# Patient Record
Sex: FEMALE | Race: WHITE | NOT HISPANIC OR LATINO
[De-identification: names, ages, dates, MRNs, and addresses within clinical notes are randomized per-mention and may not be internally consistent; named-entity substitution may affect disease eponyms.]

---

## 2018-06-28 ENCOUNTER — APPOINTMENT (OUTPATIENT)
Dept: BARIATRICS | Facility: CLINIC | Age: 68
End: 2018-06-28

## 2018-06-28 PROBLEM — Z00.00 ENCOUNTER FOR PREVENTIVE HEALTH EXAMINATION: Status: ACTIVE | Noted: 2018-06-28

## 2018-12-10 ENCOUNTER — APPOINTMENT (OUTPATIENT)
Dept: BARIATRICS | Facility: CLINIC | Age: 68
End: 2018-12-10
Payer: COMMERCIAL

## 2018-12-10 PROCEDURE — 97803 MED NUTRITION INDIV SUBSEQ: CPT

## 2020-12-04 ENCOUNTER — APPOINTMENT (OUTPATIENT)
Dept: VASCULAR SURGERY | Facility: CLINIC | Age: 70
End: 2020-12-04
Payer: COMMERCIAL

## 2020-12-04 PROCEDURE — 99072 ADDL SUPL MATRL&STAF TM PHE: CPT

## 2020-12-04 PROCEDURE — 99204 OFFICE O/P NEW MOD 45 MIN: CPT

## 2021-08-24 ENCOUNTER — APPOINTMENT (OUTPATIENT)
Dept: PEDIATRIC ORTHOPEDIC SURGERY | Facility: CLINIC | Age: 71
End: 2021-08-24
Payer: COMMERCIAL

## 2021-08-24 VITALS — BODY MASS INDEX: 36.37 KG/M2 | HEIGHT: 68 IN | WEIGHT: 240 LBS

## 2021-08-24 DIAGNOSIS — Z87.39 PERSONAL HISTORY OF OTHER DISEASES OF THE MUSCULOSKELETAL SYSTEM AND CONNECTIVE TISSUE: ICD-10-CM

## 2021-08-24 DIAGNOSIS — Z80.0 FAMILY HISTORY OF MALIGNANT NEOPLASM OF DIGESTIVE ORGANS: ICD-10-CM

## 2021-08-24 DIAGNOSIS — Z78.9 OTHER SPECIFIED HEALTH STATUS: ICD-10-CM

## 2021-08-24 PROCEDURE — 99213 OFFICE O/P EST LOW 20 MIN: CPT | Mod: 25

## 2021-08-24 PROCEDURE — 20552 NJX 1/MLT TRIGGER POINT 1/2: CPT | Mod: 59

## 2021-08-24 PROCEDURE — 20610 DRAIN/INJ JOINT/BURSA W/O US: CPT

## 2021-08-24 RX ORDER — ASPIRIN 81 MG
81 TABLET, DELAYED RELEASE (ENTERIC COATED) ORAL
Refills: 0 | Status: ACTIVE | COMMUNITY

## 2021-08-24 RX ORDER — ATORVASTATIN CALCIUM 10 MG/1
10 TABLET, FILM COATED ORAL
Refills: 0 | Status: ACTIVE | COMMUNITY

## 2021-08-24 RX ORDER — PANTOPRAZOLE 20 MG/1
20 TABLET, DELAYED RELEASE ORAL
Refills: 0 | Status: ACTIVE | COMMUNITY

## 2021-08-24 NOTE — PHYSICAL EXAM
[de-identified] : Exam today reveals she is obese.  She ambulates with a painful gait on the right side.  There is restricted motion to the lumbar spine of a mild nature.  Spasm and tenderness is noted in the lumbar musculature in the upper lumbar segment..  There is a trigger point here.  She has no significant tension signs present though straight leg raising is uncomfortable on the right side.  Neurologically no focal motor changes are noted she is neurologically stable.  With regards to her right knee there is obvious crepitus on motion and a slight flexion contracture which is chronic of approximately 8 degrees.  There is no instability on stress a moderate effusion is present pain in both medial lateral compartments.  Popliteal fossa calf neurovascular exam are negative.\par \par She has supple motion to the right hip without any significant points of tenderness about the trochanter or anterior hip capsule or buttock.  The left hip moves well with minimal restriction of abduction and internal rotation appropriate for age.  The left knee the site of the total knee replacement reveals excellent motion no effusion no point tenderness and no instability on stress.

## 2021-08-24 NOTE — HISTORY OF PRESENT ILLNESS
[de-identified] : This 70-year-old is seen today for evaluation of back pain.  This patient has a history of multiple chronic problems.  Spinal stenosis herniated disc in the lumbar segment for which she has had conservative treatment over a prolonged period of time.  She had been doing well though recently she noted increasing pain and dysfunction along the left side of the upper lumbar lower thoracic region.  She eventually presented to an urgent care center where she was injected with Toradol and sent home with prescriptions.  She still has discomfort at both sites unchanged.\par \par It is to be noted this patient was treated in the fall of last year for pericarditis following an uncharacterized viral illness which was also complicated by a DVT.  She is not on any anticoagulants other than aspirin.\par \par Her past history is also significant for right total hip replacement left total knee replacement gastric surgery bypass breast reduction hysterectomy and shoulder arthroscopy.\par \par

## 2021-08-24 NOTE — ASSESSMENT
[FreeTextEntry1] : Impression: Myalgia lumbar spine.  Symptomatic arthritis right knee.  Stable status post right total hip replacement and left total knee replacement\par \par Under sterile technique the trigger point in the lumbar musculature was injected with 40 mg of Depo-Medrol and 2 cc of 1% lidocaine.  Similarly the right knee under sterile technique was injected with 40 mg of Depo-Medrol and 3 cc of 1% lidocaine uneventfully.  I have advised flexibility exercises at home for the spine with the potential for physical therapy and pain management if she is not improving.  She has also been prescribed Tylenol 3 and tizanidine medications.  34 minutes was spent evaluating this patient along with discussion of potential courses of action if she has not improving as well as use of medications.

## 2021-08-27 ENCOUNTER — NON-APPOINTMENT (OUTPATIENT)
Age: 71
End: 2021-08-27

## 2021-09-01 ENCOUNTER — NON-APPOINTMENT (OUTPATIENT)
Age: 71
End: 2021-09-01

## 2021-12-17 ENCOUNTER — APPOINTMENT (OUTPATIENT)
Dept: PEDIATRIC ORTHOPEDIC SURGERY | Facility: CLINIC | Age: 71
End: 2021-12-17
Payer: COMMERCIAL

## 2021-12-17 VITALS — HEIGHT: 68 IN | BODY MASS INDEX: 36.37 KG/M2 | WEIGHT: 240 LBS

## 2021-12-17 PROCEDURE — 99212 OFFICE O/P EST SF 10 MIN: CPT | Mod: 25

## 2021-12-17 PROCEDURE — 20610 DRAIN/INJ JOINT/BURSA W/O US: CPT

## 2021-12-17 RX ORDER — HYLAN G-F 20 16MG/2ML
16 SYRINGE (ML) INTRAARTICULAR
Qty: 1 | Refills: 0 | Status: ACTIVE | COMMUNITY
Start: 2021-12-17 | End: 1900-01-01

## 2021-12-17 NOTE — PROCEDURE
[FreeTextEntry1] : Under sterile technique with a Betadine prep using a 22-gauge needle from a medial portal the right knee was injected with 40 mg of Kenalog and 2 cc of 1% lidocaine with a Band-Aid dressing applied at the conclusion this tolerated well

## 2021-12-17 NOTE — HISTORY OF PRESENT ILLNESS
[de-identified] : This patient returns for evaluation of her right knee she has continued to have significant pain and is requesting injection.  We will be seeking again authorization for Hyalgan injections into the right knee as this patient has had improvement with this in the past.

## 2021-12-17 NOTE — ASSESSMENT
[FreeTextEntry1] : Impression: Painful symptomatic advanced arthritis right knee.\par \par She will continue with over-the-counter medications and restricted activities we are seeking authorization for Hyalgan injections into the right knee.  She will return as necessary

## 2021-12-17 NOTE — PHYSICAL EXAM
[de-identified] : Her exam reveals an antalgic gait with limp on the right side she has a moderate effusion to the right knee with painful motion no instability diffuse tenderness noted in both medial lateral compartments

## 2022-01-04 ENCOUNTER — APPOINTMENT (OUTPATIENT)
Dept: BARIATRICS/WEIGHT MGMT | Facility: CLINIC | Age: 72
End: 2022-01-04
Payer: COMMERCIAL

## 2022-01-04 ENCOUNTER — TRANSCRIPTION ENCOUNTER (OUTPATIENT)
Age: 72
End: 2022-01-04

## 2022-01-04 VITALS — WEIGHT: 259 LBS | BODY MASS INDEX: 39.25 KG/M2 | HEIGHT: 68 IN

## 2022-01-04 DIAGNOSIS — Z98.84 BARIATRIC SURGERY STATUS: ICD-10-CM

## 2022-01-04 DIAGNOSIS — M79.18 MYALGIA, OTHER SITE: ICD-10-CM

## 2022-01-04 DIAGNOSIS — Z96.641 PRESENCE OF RIGHT ARTIFICIAL HIP JOINT: ICD-10-CM

## 2022-01-04 DIAGNOSIS — Z96.652 PRESENCE OF LEFT ARTIFICIAL KNEE JOINT: ICD-10-CM

## 2022-01-04 DIAGNOSIS — E66.9 OBESITY, UNSPECIFIED: ICD-10-CM

## 2022-01-04 PROCEDURE — 99204 OFFICE O/P NEW MOD 45 MIN: CPT | Mod: 95

## 2022-01-04 RX ORDER — BUPROPION HYDROCHLORIDE 100 MG/1
TABLET, FILM COATED ORAL
Refills: 0 | Status: ACTIVE | COMMUNITY

## 2022-01-04 NOTE — ASSESSMENT
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 Recommend a diet high in vegetables intake & lean protein. Recommend 60-80 grams of protein daily and 64 ounces of water intake daily.\par Recommend reducing dietary intake of carbohydrates and eating complex carbs instead of simple carbs. Discussed healthy carb options.\par Recommend Tracking dietary intake daily to increase your awareness of dietary intake. \par Physical Activity- limited by arthritis. Recommend trying chair yoga or gentle chair exercises- can look up options on you tube. May benefit from PT if ortho feels this is appropriate. \par Labs- reviewed recent lab results from 7/2021. \par Medication- pt meets criteria to initiate medication treatment of obesity, but limited in medication options due to her age. Would recommend increasing dose of Wellbutrin to 200 or 300 mg daily to help with emotional eating component. \par RTO in 1 month\par \par

## 2022-01-04 NOTE — CONSULT LETTER
[Dear  ___] : Dear  [unfilled], [( Thank you for referring [unfilled] for consultation for _____ )] : Thank you for referring [unfilled] for consultation for [unfilled] [Please see my note below.] : Please see my note below. [Consult Closing:] : Thank you very much for allowing me to participate in the care of this patient.  If you have any questions, please do not hesitate to contact me. [Sincerely,] : Sincerely, [FreeTextEntry3] : Brandy Shaw, NP

## 2022-01-04 NOTE — HISTORY OF PRESENT ILLNESS
[Other Location: e.g. School (Enter Location, City,State)___] : at [unfilled], at the time of the visit. [Other Location: e.g. Home (Enter Location, City,State)___] : at [unfilled] [Verbal consent obtained from patient] : the patient, [unfilled] [FreeTextEntry1] : 70 y/o Female referred for medical weight loss by Dr. Gonzales \par Hx of sleeve gastrectomy in 2018, states that she lost 90 lbs and has regained 30 lbs over the last 2 years during the COVID 19 pandemic\par + adequate protein intake and water intake but started incorporated more carbs over time and became less active due to knee, back and foot pain. Walks with a limp at present, denies using an assistive device.\par Highest Adult Wt pre-op- 292 lbs\par Food Recall:  B-hard boiled eggs, snack-nuts, L-chicken cutlet, small rice, eggplant, D- 2 slices of steak, mashed potato & salad, snack- fruit, premier protein shake.\par Water Intake- adequate. \par Exercise- cannot tolerate walking long distances. Open to trying chair exercises, but hasn't tried \par Stress Level- low at present, lives with her daughters family\par Sleep- adequate\par Social Hx- employed, lives with daughter, denies alcohol use, denies illicit drug use, denies smoking \par \par

## 2022-01-28 RX ORDER — TIZANIDINE 4 MG/1
4 TABLET ORAL 3 TIMES DAILY
Qty: 30 | Refills: 0 | Status: ACTIVE | COMMUNITY
Start: 2022-01-28 | End: 1900-01-01

## 2022-01-28 RX ORDER — HYDROCODONE BITARTRATE AND ACETAMINOPHEN 5; 325 MG/1; MG/1
5-325 TABLET ORAL
Qty: 15 | Refills: 0 | Status: ACTIVE | COMMUNITY
Start: 2022-01-28 | End: 1900-01-01

## 2022-01-31 RX ORDER — HYDROCODONE BITARTRATE AND ACETAMINOPHEN 5; 325 MG/1; MG/1
5-325 TABLET ORAL
Qty: 15 | Refills: 0 | Status: ACTIVE | COMMUNITY
Start: 2022-01-31 | End: 1900-01-01

## 2022-02-10 ENCOUNTER — APPOINTMENT (OUTPATIENT)
Dept: PEDIATRIC ORTHOPEDIC SURGERY | Facility: CLINIC | Age: 72
End: 2022-02-10
Payer: COMMERCIAL

## 2022-02-10 VITALS — WEIGHT: 259 LBS | HEIGHT: 68 IN | BODY MASS INDEX: 39.25 KG/M2

## 2022-02-10 PROCEDURE — 20610 DRAIN/INJ JOINT/BURSA W/O US: CPT

## 2022-02-10 NOTE — PROCEDURE
[Injection] : Injection [Right] : of the right [Knee Joint] : knee joint [Osteoarthritis] : Osteoarthritis [Inflammation] : Inflammation [Joint Pain] : Joint pain [Patient] : patient [Risk] : risk [Benefits] : benefits [Alternatives] : alternatives [Bleeding] : bleeding [Infection] : infection [Allergic Reaction] : allergic reaction [Verbal Consent Obtained] : verbal consent was obtained prior to the procedure [Betadine] : Betadine [Ethyl Chloride Spray] : ethyl chloride spray was used as a topical anesthetic [1%] : 1% lidocaine [Medial] : medial [22] : a 22-gauge [2 mL Euflexxa___(lot #)] : 2mL Euflexxa ~Ulot# [unfilled] [Bandage Applied] : a bandage [Tolerated Well] : The patient tolerated the procedure well [None] : none [No Strenuous Activity___day(s)] : avoid strenuous activity for [unfilled] day(s) [___ Week(s)] : in [unfilled] week(s)

## 2022-02-10 NOTE — HISTORY OF PRESENT ILLNESS
[de-identified] : This 71-year-old returns because of persistent right knee pain stiffness mild swelling and limp.  She is here for her first Euflexxa injection

## 2022-02-10 NOTE — PHYSICAL EXAM
[de-identified] : Her exam reveals no significant interval changes she is obese.  Antalgic gait right greater than left.  Good motion to the right hip the right knee 5 degree lack to full extension and 8 degrees full flexion.  No instability she has moderate effusion crepitus on motion and pain in all 3 compartments popliteal fossa calf neurovascular exam are stable

## 2022-02-10 NOTE — ASSESSMENT
[FreeTextEntry1] : Impression: Symptomatic arthritis right knee.\par \par She received her first Euflexxa injection we have discussed care after which she will return in 1 week for her second

## 2022-02-17 ENCOUNTER — APPOINTMENT (OUTPATIENT)
Dept: PEDIATRIC ORTHOPEDIC SURGERY | Facility: CLINIC | Age: 72
End: 2022-02-17
Payer: COMMERCIAL

## 2022-02-17 VITALS — WEIGHT: 259 LBS | HEIGHT: 68 IN | BODY MASS INDEX: 39.25 KG/M2

## 2022-02-17 PROCEDURE — 20610 DRAIN/INJ JOINT/BURSA W/O US: CPT

## 2022-02-17 NOTE — PROCEDURE
[FreeTextEntry1] : Under sterile technique with a Betadine prep using a 22-gauge needle the right knee was injected from the medial portal with 2 mL of Euflexxa with a Band-Aid dressing applied at the conclusion this was tolerated well.  The lot number for this medication is [id number]

## 2022-02-17 NOTE — PHYSICAL EXAM
[de-identified] : On exam there is a less effusion to the knee with good motion less discomfort in the medial compartment.

## 2022-02-17 NOTE — HISTORY OF PRESENT ILLNESS
[de-identified] : This 71-year-old returns for her second Euflexxa injection to the right knee she tolerated the first very well

## 2022-02-17 NOTE — ASSESSMENT
[FreeTextEntry1] : Impression: Symptomatic arthritis right knee.\par \par Under sterile technique this patient received a second Euflexxa injection uneventfully.  She will return in 1 week for the third

## 2022-02-24 ENCOUNTER — APPOINTMENT (OUTPATIENT)
Dept: PEDIATRIC ORTHOPEDIC SURGERY | Facility: CLINIC | Age: 72
End: 2022-02-24
Payer: COMMERCIAL

## 2022-02-24 VITALS — BODY MASS INDEX: 39.25 KG/M2 | HEIGHT: 68 IN | WEIGHT: 259 LBS

## 2022-02-24 VITALS — HEIGHT: 68 IN | BODY MASS INDEX: 39.25 KG/M2 | WEIGHT: 259 LBS

## 2022-02-24 PROCEDURE — 20610 DRAIN/INJ JOINT/BURSA W/O US: CPT

## 2022-02-24 NOTE — HISTORY OF PRESENT ILLNESS
[de-identified] : This 71-year-old returns for her final Euflexxa injection to the right knee.  She is noting slow but progressive improvement following the first 2 shots

## 2022-02-24 NOTE — ASSESSMENT
[FreeTextEntry1] : Impression: Symptomatic arthritis right knee.\par \par She received her final Euflexxa injection uneventfully she will return as necessary

## 2022-02-24 NOTE — PHYSICAL EXAM
[de-identified] : Her exam reveals a small effusion to the knee with otherwise unchanged exam less tenderness to palpation

## 2022-02-24 NOTE — PROCEDURE
[FreeTextEntry1] : Under sterile technique with a Betadine prep the right knee was injected with a 22-gauge needle from a medial portal with 2 mL of Euflexxa with a Band-Aid dressing applied at the occlusion at the conclusion this was tolerated well.  The lot number is [id number]

## 2022-04-25 ENCOUNTER — APPOINTMENT (OUTPATIENT)
Dept: PEDIATRIC ORTHOPEDIC SURGERY | Facility: CLINIC | Age: 72
End: 2022-04-25
Payer: COMMERCIAL

## 2022-04-25 VITALS — BODY MASS INDEX: 39.25 KG/M2 | HEIGHT: 68 IN | WEIGHT: 259 LBS

## 2022-04-25 PROCEDURE — 99212 OFFICE O/P EST SF 10 MIN: CPT | Mod: 25

## 2022-04-25 PROCEDURE — 20610 DRAIN/INJ JOINT/BURSA W/O US: CPT

## 2022-04-25 NOTE — ASSESSMENT
[FreeTextEntry1] : Impression: Symptomatic advanced arthritis right knee.\par \par We have again discussed the likelihood of joint replacement surgery over time.  Return as necessary

## 2022-04-25 NOTE — PHYSICAL EXAM
[de-identified] : Her exam reveals an antalgic gait with limp painful motion no erythema or increased warmth to suggest infection.  Tender in both medial and lateral compartments neurovascular status is intact no evidence of DVT

## 2022-04-25 NOTE — HISTORY OF PRESENT ILLNESS
[de-identified] : This patient returns the series of Euflexxa injection she received did not provide her with any benefit whatsoever.  She has had a flare of pain and is requesting a steroid injection

## 2022-06-15 RX ORDER — TIZANIDINE 4 MG/1
4 TABLET ORAL 3 TIMES DAILY
Qty: 30 | Refills: 1 | Status: ACTIVE | COMMUNITY
Start: 2022-06-15 | End: 1900-01-01

## 2022-06-15 RX ORDER — HYDROCODONE BITARTRATE AND ACETAMINOPHEN 5; 325 MG/1; MG/1
5-325 TABLET ORAL
Qty: 20 | Refills: 0 | Status: ACTIVE | COMMUNITY
Start: 2022-06-15 | End: 1900-01-01

## 2022-06-17 RX ORDER — TIZANIDINE 4 MG/1
4 TABLET ORAL 3 TIMES DAILY
Qty: 30 | Refills: 1 | Status: ACTIVE | COMMUNITY
Start: 2022-06-17 | End: 1900-01-01

## 2022-06-17 RX ORDER — HYDROCODONE BITARTRATE AND ACETAMINOPHEN 5; 325 MG/1; MG/1
5-325 TABLET ORAL
Qty: 15 | Refills: 0 | Status: ACTIVE | COMMUNITY
Start: 2022-06-17 | End: 1900-01-01

## 2022-07-15 ENCOUNTER — APPOINTMENT (OUTPATIENT)
Dept: PEDIATRIC ORTHOPEDIC SURGERY | Facility: CLINIC | Age: 72
End: 2022-07-15

## 2022-07-15 VITALS — HEIGHT: 68 IN | WEIGHT: 260 LBS | BODY MASS INDEX: 39.4 KG/M2

## 2022-07-15 DIAGNOSIS — M17.11 UNILATERAL PRIMARY OSTEOARTHRITIS, RIGHT KNEE: ICD-10-CM

## 2022-07-15 PROCEDURE — 99213 OFFICE O/P EST LOW 20 MIN: CPT

## 2022-07-15 NOTE — PHYSICAL EXAM
[de-identified] : Her exam reveals an obvious antalgic gait with limp on the right side moderate effusion to the knee no increased warmth or erythema.  Her motion is good though it is with crepitus and diffuse pain.  She has had documented x-rays in the past revealing advanced arthritic change

## 2022-07-15 NOTE — HISTORY OF PRESENT ILLNESS
[de-identified] : Patient returns she has continued to have significant pain stiffness and limp with increasing functional disability to the right knee.  She is becoming depressed over her ability to function and has been using a walker as well

## 2023-03-21 ENCOUNTER — APPOINTMENT (OUTPATIENT)
Dept: PEDIATRIC ORTHOPEDIC SURGERY | Facility: CLINIC | Age: 73
End: 2023-03-21
Payer: COMMERCIAL

## 2023-03-21 VITALS
WEIGHT: 260 LBS | SYSTOLIC BLOOD PRESSURE: 164 MMHG | BODY MASS INDEX: 39.4 KG/M2 | HEIGHT: 68 IN | DIASTOLIC BLOOD PRESSURE: 75 MMHG | TEMPERATURE: 97.6 F

## 2023-03-21 DIAGNOSIS — M75.41 IMPINGEMENT SYNDROME OF RIGHT SHOULDER: ICD-10-CM

## 2023-03-21 DIAGNOSIS — M54.12 RADICULOPATHY, CERVICAL REGION: ICD-10-CM

## 2023-03-21 DIAGNOSIS — M75.42 IMPINGEMENT SYNDROME OF LEFT SHOULDER: ICD-10-CM

## 2023-03-21 PROCEDURE — 73030 X-RAY EXAM OF SHOULDER: CPT | Mod: 50

## 2023-03-21 PROCEDURE — 72040 X-RAY EXAM NECK SPINE 2-3 VW: CPT

## 2023-03-21 PROCEDURE — 99213 OFFICE O/P EST LOW 20 MIN: CPT

## 2023-03-21 NOTE — PHYSICAL EXAM
[de-identified] : Examination today reveals mild restricted motion to the cervical spine in flexion extension.  Rotation and tilt to the left is painful.  There is spasm and tenderness on both sides of the midline in the subaxial region though no trigger points present.  Examination of both shoulders reveals she has an obvious arc of impingement on both sides more so on the left.  Mild restricted abduction and forward flexion in the scapular plane again left greater than right.  There is no crepitus or clicking on motion.  No instability on stress.  Tenderness in the subacromial space is noted again left greater than right.  She is neurologically intact.\par \par X-rays ordered and taken today of the cervical spine and both shoulders reveal significant disc space narrowing at C4-5, C5-6 and see 6-7 with spur formation and spondylosis.  Both shoulders reveal mild degenerative changes with spur formation

## 2023-03-21 NOTE — ASSESSMENT
[FreeTextEntry1] : Pression: Cervical radiculitis impingement syndrome both shoulders.\par \par She will be treated with a Medrol Dosepak along with tizanidine and will return on a as needed basis.

## 2023-03-21 NOTE — HISTORY OF PRESENT ILLNESS
[de-identified] : This 72-year-old returns she has had 2 weeks of pain to both shoulders as well as in the base of the neck with no obvious injury.  She has had acupuncture with no significant improvement.  She has significant muscle spasms her pain does travel down the left upper extremity into the hand.  She does have pain with placement of both upper extremities and space above the horizontal plane.  It is to be noted she had a right total knee replacement this past November and is doing well with this

## 2023-03-22 RX ORDER — TIZANIDINE 4 MG/1
4 TABLET ORAL TWICE DAILY
Qty: 30 | Refills: 1 | Status: ACTIVE | COMMUNITY
Start: 2023-03-22 | End: 1900-01-01

## 2023-03-22 RX ORDER — METHYLPREDNISOLONE 4 MG/1
4 TABLET ORAL
Qty: 1 | Refills: 1 | Status: ACTIVE | COMMUNITY
Start: 2023-03-22 | End: 1900-01-01

## 2023-06-09 ENCOUNTER — APPOINTMENT (OUTPATIENT)
Dept: PEDIATRIC ORTHOPEDIC SURGERY | Facility: CLINIC | Age: 73
End: 2023-06-09
Payer: COMMERCIAL

## 2023-06-09 VITALS
WEIGHT: 260 LBS | SYSTOLIC BLOOD PRESSURE: 145 MMHG | HEIGHT: 68 IN | BODY MASS INDEX: 39.4 KG/M2 | DIASTOLIC BLOOD PRESSURE: 85 MMHG | TEMPERATURE: 97.1 F

## 2023-06-09 PROCEDURE — 73502 X-RAY EXAM HIP UNI 2-3 VIEWS: CPT

## 2023-06-09 PROCEDURE — 99213 OFFICE O/P EST LOW 20 MIN: CPT

## 2023-06-09 NOTE — HISTORY OF PRESENT ILLNESS
[de-identified] : This 72-year-old is seen today for evaluation of her left hip.  This patient is status post right total knee replacement and is doing well with this however over time because of the significant length of time she was using a walker ambulating with a flexed attitude forward secondary to hip flexor contractures as well as chronic back pain.  She has been struggling along in PT.  Her right hip is asymptomatic.

## 2023-06-09 NOTE — ASSESSMENT
[FreeTextEntry1] : Impression: Symptomatic arthritis left hip.\par \par She is unable to tolerate nonsteroidals.  She will continue with her therapy weight reduction and I have prescribed intra-articular steroid injection into the left hip by radiology.  Ultimately if she is not improving and is miserable and no longer willing to tolerate this condition she will need left total hip arthroplasty.

## 2023-06-09 NOTE — PHYSICAL EXAM
[de-identified] : Examination today reveals she has an obvious limp more so on the left side.  She has restricted motion to this left hip in all planes with pain on motion.  The left knee is unremarkable.  She has good motion to the right hip as well as to the right knee.  Straight leg raising is negative.  She is neurologically stable.\par \par X-rays ordered and taken today of the left hip to include the pelvis reveal moderate to advancing degenerative arthritis of the left hip.  The right hip is in stable alignment without interval change

## 2023-10-16 RX ORDER — HYDROCODONE BITARTRATE AND ACETAMINOPHEN 5; 325 MG/1; MG/1
5-325 TABLET ORAL
Qty: 15 | Refills: 0 | Status: ACTIVE | COMMUNITY
Start: 2023-10-16 | End: 1900-01-01

## 2023-10-16 RX ORDER — TIZANIDINE 4 MG/1
4 TABLET ORAL 3 TIMES DAILY
Qty: 30 | Refills: 1 | Status: ACTIVE | COMMUNITY
Start: 2023-10-16 | End: 1900-01-01

## 2023-10-20 ENCOUNTER — APPOINTMENT (OUTPATIENT)
Dept: PEDIATRIC ORTHOPEDIC SURGERY | Facility: CLINIC | Age: 73
End: 2023-10-20
Payer: COMMERCIAL

## 2023-10-20 VITALS — HEIGHT: 68 IN | WEIGHT: 260 LBS | BODY MASS INDEX: 39.4 KG/M2

## 2023-10-20 PROCEDURE — 20552 NJX 1/MLT TRIGGER POINT 1/2: CPT | Mod: 59

## 2023-10-20 PROCEDURE — 99213 OFFICE O/P EST LOW 20 MIN: CPT | Mod: 25

## 2023-12-26 ENCOUNTER — APPOINTMENT (OUTPATIENT)
Dept: PEDIATRIC ORTHOPEDIC SURGERY | Facility: CLINIC | Age: 73
End: 2023-12-26
Payer: COMMERCIAL

## 2023-12-26 VITALS
DIASTOLIC BLOOD PRESSURE: 72 MMHG | BODY MASS INDEX: 39.4 KG/M2 | HEIGHT: 68 IN | TEMPERATURE: 96.7 F | SYSTOLIC BLOOD PRESSURE: 133 MMHG | WEIGHT: 260 LBS

## 2023-12-26 DIAGNOSIS — M16.12 UNILATERAL PRIMARY OSTEOARTHRITIS, LEFT HIP: ICD-10-CM

## 2023-12-26 PROCEDURE — 99213 OFFICE O/P EST LOW 20 MIN: CPT | Mod: 25

## 2023-12-26 PROCEDURE — 72110 X-RAY EXAM L-2 SPINE 4/>VWS: CPT | Mod: 26

## 2023-12-26 PROCEDURE — 20552 NJX 1/MLT TRIGGER POINT 1/2: CPT

## 2023-12-26 NOTE — PHYSICAL EXAM
[de-identified] : Her exam again she is obese.  She has a painful gait using a rollator walker.  She has significant restricted motion to the lumbar spine with pain and spasm on the left side there is a trigger point over the musculature in the region of the SI joint.  Poor motion to the left hip has been longstanding with pain.  Neurologically there is no focal motor weakness present.  Review of x-rays of the lumbar spine from [facility] radiology December 1, 2023 reveal multilevel degenerative disc space narrowing spondylosis facet arthritis and a stable grade 1-2 spondylolisthesis at L3-4 which does not move on laterals taken in flexion/extension.  Likely an element of stenosis.

## 2023-12-26 NOTE — HISTORY OF PRESENT ILLNESS
[de-identified] : This 73-year-old returns for evaluation of severe back and left hip pain.  This patient was treated recently for an injury to the left foot with a cam walker and apparently the difference in leg lengths had a marked defect on pain in her low back and left hip region.  This has been quite exquisite with pain in the left side of the lumbar spine and buttock as well as the hip that radiates into the knee.  She has been seen by another orthopedist who performed a knee replacement on her and he ordered x-rays of the lumbar spine and laterals of the pelvis which were performed at [facility] radiology December 1.  She comes in today for opinion as she is miserable.

## 2023-12-26 NOTE — ASSESSMENT
[FreeTextEntry1] : Impression: Myofascial pain/lumbar radiculopathy.  Advanced arthritis left hip.  I have injected the trigger point in the lumbar musculature.  I have advised this patient she needs to be reevaluated by her orthopedist who is performing a joint replacement on her as I feel it is time for her to seriously consider left hip replacement surgery as this would have a significant benefit on all of her complaints and lessen the load on her lumbar spine.

## 2024-01-01 NOTE — PROCEDURE
[FreeTextEntry1] : Under sterile technique with a Betadine prep the right knee was injected from a medial portal with a 22-gauge needle with 40 mg of Kenalog and 2 cc of 1% lidocaine with a Band-Aid dressing applied at the conclusion this was tolerated without incident
2

## 2024-01-31 ENCOUNTER — TRANSCRIPTION ENCOUNTER (OUTPATIENT)
Age: 74
End: 2024-01-31

## 2024-03-21 RX ORDER — TIZANIDINE 2 MG/1
2 TABLET ORAL
Qty: 40 | Refills: 1 | Status: ACTIVE | COMMUNITY
Start: 2024-03-21 | End: 1900-01-01

## 2024-05-03 ENCOUNTER — APPOINTMENT (OUTPATIENT)
Dept: PEDIATRIC ORTHOPEDIC SURGERY | Facility: CLINIC | Age: 74
End: 2024-05-03
Payer: COMMERCIAL

## 2024-05-03 DIAGNOSIS — M79.18 MYALGIA, OTHER SITE: ICD-10-CM

## 2024-05-03 DIAGNOSIS — G89.29 MYALGIA, OTHER SITE: ICD-10-CM

## 2024-05-03 DIAGNOSIS — M54.16 RADICULOPATHY, LUMBAR REGION: ICD-10-CM

## 2024-05-03 PROCEDURE — 99213 OFFICE O/P EST LOW 20 MIN: CPT | Mod: 25

## 2024-05-03 PROCEDURE — 20552 NJX 1/MLT TRIGGER POINT 1/2: CPT

## 2024-05-03 NOTE — ASSESSMENT
[FreeTextEntry1] : Impression: Lumbar radiculitis/myofascial pain.  The trigger point has been injected she will continue with tizanidine return as needed

## 2024-05-03 NOTE — PHYSICAL EXAM
[de-identified] : Exam today reveals restricted motion to the lumbar segment spasm and tenderness is noted on the left side of the lumbar musculature where there is a trigger point.  She otherwise is neurologically stable

## 2024-05-03 NOTE — HISTORY OF PRESENT ILLNESS
[de-identified] : This 73-year-old returns she has been in therapy and the therapist apparently was a little bit aggressive in this increased back pain in the upper left lumbar region.  No radiation distally she otherwise has been doing reasonably well

## 2024-07-09 RX ORDER — TIZANIDINE 2 MG/1
2 TABLET ORAL
Qty: 40 | Refills: 1 | Status: ACTIVE | COMMUNITY
Start: 2024-07-09 | End: 1900-01-01

## 2025-03-25 ENCOUNTER — RESULT REVIEW (OUTPATIENT)
Age: 75
End: 2025-03-25

## 2025-09-11 ENCOUNTER — NON-APPOINTMENT (OUTPATIENT)
Age: 75
End: 2025-09-11

## 2025-09-12 ENCOUNTER — APPOINTMENT (OUTPATIENT)
Dept: BARIATRICS | Facility: CLINIC | Age: 75
End: 2025-09-12
Payer: COMMERCIAL

## 2025-09-12 VITALS — BODY MASS INDEX: 42.57 KG/M2 | WEIGHT: 280 LBS

## 2025-09-12 PROCEDURE — 97803 MED NUTRITION INDIV SUBSEQ: CPT
